# Patient Record
Sex: FEMALE | Race: WHITE | NOT HISPANIC OR LATINO | Employment: OTHER | URBAN - METROPOLITAN AREA
[De-identification: names, ages, dates, MRNs, and addresses within clinical notes are randomized per-mention and may not be internally consistent; named-entity substitution may affect disease eponyms.]

---

## 2022-03-21 ENCOUNTER — TELEPHONE (OUTPATIENT)
Dept: GASTROENTEROLOGY | Facility: CLINIC | Age: 77
End: 2022-03-21

## 2022-03-21 ENCOUNTER — PREP FOR SURGERY (OUTPATIENT)
Dept: OTHER | Facility: HOSPITAL | Age: 77
End: 2022-03-21

## 2022-03-21 DIAGNOSIS — Z86.010 PERSONAL HISTORY OF COLONIC POLYPS: Primary | ICD-10-CM

## 2022-03-28 PROBLEM — Z86.010 PERSONAL HISTORY OF COLONIC POLYPS: Status: ACTIVE | Noted: 2022-03-28

## 2022-03-28 PROBLEM — Z86.0100 PERSONAL HISTORY OF COLONIC POLYPS: Status: ACTIVE | Noted: 2022-03-28

## 2022-07-27 ENCOUNTER — TRANSCRIBE ORDERS (OUTPATIENT)
Dept: ADMINISTRATIVE | Facility: HOSPITAL | Age: 77
End: 2022-07-27

## 2022-07-27 ENCOUNTER — LAB (OUTPATIENT)
Dept: LAB | Facility: HOSPITAL | Age: 77
End: 2022-07-27

## 2022-07-27 DIAGNOSIS — I10 ESSENTIAL HYPERTENSION, MALIGNANT: ICD-10-CM

## 2022-07-27 DIAGNOSIS — I10 ESSENTIAL HYPERTENSION, MALIGNANT: Primary | ICD-10-CM

## 2022-07-27 DIAGNOSIS — Z01.818 PRE-OP EXAM: Primary | ICD-10-CM

## 2022-07-27 LAB
ANION GAP SERPL CALCULATED.3IONS-SCNC: 12 MMOL/L (ref 5–15)
BUN SERPL-MCNC: 17 MG/DL (ref 8–23)
BUN/CREAT SERPL: 19.5 (ref 7–25)
CALCIUM SPEC-SCNC: 8.9 MG/DL (ref 8.6–10.5)
CHLORIDE SERPL-SCNC: 107 MMOL/L (ref 98–107)
CO2 SERPL-SCNC: 23 MMOL/L (ref 22–29)
CREAT SERPL-MCNC: 0.87 MG/DL (ref 0.76–1.27)
EGFRCR SERPLBLD CKD-EPI 2021: 89.4 ML/MIN/1.73
GLUCOSE SERPL-MCNC: 94 MG/DL (ref 65–99)
POTASSIUM SERPL-SCNC: 4.4 MMOL/L (ref 3.5–5.2)
SODIUM SERPL-SCNC: 142 MMOL/L (ref 136–145)

## 2022-07-27 PROCEDURE — 36415 COLL VENOUS BLD VENIPUNCTURE: CPT

## 2022-07-27 PROCEDURE — 80048 BASIC METABOLIC PNL TOTAL CA: CPT

## 2022-08-02 ENCOUNTER — ANESTHESIA EVENT (OUTPATIENT)
Dept: PERIOP | Facility: HOSPITAL | Age: 77
End: 2022-08-02

## 2022-08-03 ENCOUNTER — HOSPITAL ENCOUNTER (OUTPATIENT)
Facility: HOSPITAL | Age: 77
Setting detail: HOSPITAL OUTPATIENT SURGERY
Discharge: HOME | End: 2022-08-03
Attending: INTERNAL MEDICINE | Admitting: INTERNAL MEDICINE

## 2022-08-03 ENCOUNTER — ANESTHESIA (OUTPATIENT)
Dept: PERIOP | Facility: HOSPITAL | Age: 77
End: 2022-08-03

## 2022-08-03 VITALS
RESPIRATION RATE: 15 BRPM | HEART RATE: 73 BPM | WEIGHT: 192 LBS | BODY MASS INDEX: 37.69 KG/M2 | TEMPERATURE: 97.8 F | DIASTOLIC BLOOD PRESSURE: 73 MMHG | OXYGEN SATURATION: 94 % | SYSTOLIC BLOOD PRESSURE: 116 MMHG | HEIGHT: 60 IN

## 2022-08-03 DIAGNOSIS — Z86.010 PERSONAL HISTORY OF COLONIC POLYPS: ICD-10-CM

## 2022-08-03 PROCEDURE — 25010000002 PROPOFOL 10 MG/ML EMULSION: Performed by: NURSE ANESTHETIST, CERTIFIED REGISTERED

## 2022-08-03 PROCEDURE — 45380 COLONOSCOPY AND BIOPSY: CPT | Performed by: INTERNAL MEDICINE

## 2022-08-03 PROCEDURE — 88305 TISSUE EXAM BY PATHOLOGIST: CPT | Performed by: INTERNAL MEDICINE

## 2022-08-03 PROCEDURE — 45385 COLONOSCOPY W/LESION REMOVAL: CPT | Performed by: INTERNAL MEDICINE

## 2022-08-03 RX ORDER — ISOSORBIDE MONONITRATE 60 MG/1
60 TABLET, EXTENDED RELEASE ORAL DAILY
COMMUNITY

## 2022-08-03 RX ORDER — LISINOPRIL 10 MG/1
10 TABLET ORAL DAILY
COMMUNITY

## 2022-08-03 RX ORDER — AMLODIPINE BESYLATE 10 MG/1
10 TABLET ORAL DAILY
COMMUNITY

## 2022-08-03 RX ORDER — SODIUM CHLORIDE 0.9 % (FLUSH) 0.9 %
10 SYRINGE (ML) INJECTION EVERY 12 HOURS SCHEDULED
Status: DISCONTINUED | OUTPATIENT
Start: 2022-08-03 | End: 2022-08-03 | Stop reason: HOSPADM

## 2022-08-03 RX ORDER — OMEPRAZOLE 20 MG/1
20 CAPSULE, DELAYED RELEASE ORAL DAILY
COMMUNITY

## 2022-08-03 RX ORDER — MECLIZINE HYDROCHLORIDE 25 MG/1
25 TABLET ORAL 3 TIMES DAILY PRN
COMMUNITY

## 2022-08-03 RX ORDER — PROPOFOL 10 MG/ML
VIAL (ML) INTRAVENOUS AS NEEDED
Status: DISCONTINUED | OUTPATIENT
Start: 2022-08-03 | End: 2022-08-03 | Stop reason: SURG

## 2022-08-03 RX ORDER — PREGABALIN 50 MG/1
50 CAPSULE ORAL 3 TIMES DAILY
COMMUNITY

## 2022-08-03 RX ORDER — KETAMINE HYDROCHLORIDE 10 MG/ML
INJECTION INTRAMUSCULAR; INTRAVENOUS AS NEEDED
Status: DISCONTINUED | OUTPATIENT
Start: 2022-08-03 | End: 2022-08-03 | Stop reason: SURG

## 2022-08-03 RX ORDER — SODIUM CHLORIDE, SODIUM LACTATE, POTASSIUM CHLORIDE, CALCIUM CHLORIDE 600; 310; 30; 20 MG/100ML; MG/100ML; MG/100ML; MG/100ML
100 INJECTION, SOLUTION INTRAVENOUS CONTINUOUS
Status: DISCONTINUED | OUTPATIENT
Start: 2022-08-03 | End: 2022-08-03 | Stop reason: HOSPADM

## 2022-08-03 RX ORDER — BUPROPION HYDROCHLORIDE 100 MG/1
100 TABLET ORAL 2 TIMES DAILY
COMMUNITY

## 2022-08-03 RX ORDER — LIDOCAINE HYDROCHLORIDE 10 MG/ML
0.5 INJECTION, SOLUTION EPIDURAL; INFILTRATION; INTRACAUDAL; PERINEURAL ONCE AS NEEDED
Status: DISCONTINUED | OUTPATIENT
Start: 2022-08-03 | End: 2022-08-03 | Stop reason: HOSPADM

## 2022-08-03 RX ORDER — ASPIRIN 81 MG/1
81 TABLET, CHEWABLE ORAL DAILY
COMMUNITY

## 2022-08-03 RX ORDER — SODIUM CHLORIDE, SODIUM LACTATE, POTASSIUM CHLORIDE, CALCIUM CHLORIDE 600; 310; 30; 20 MG/100ML; MG/100ML; MG/100ML; MG/100ML
9 INJECTION, SOLUTION INTRAVENOUS CONTINUOUS
Status: DISCONTINUED | OUTPATIENT
Start: 2022-08-03 | End: 2022-08-03 | Stop reason: HOSPADM

## 2022-08-03 RX ORDER — SODIUM CHLORIDE 0.9 % (FLUSH) 0.9 %
10 SYRINGE (ML) INJECTION AS NEEDED
Status: DISCONTINUED | OUTPATIENT
Start: 2022-08-03 | End: 2022-08-03 | Stop reason: HOSPADM

## 2022-08-03 RX ORDER — ONDANSETRON 2 MG/ML
4 INJECTION INTRAMUSCULAR; INTRAVENOUS ONCE AS NEEDED
Status: DISCONTINUED | OUTPATIENT
Start: 2022-08-03 | End: 2022-08-03 | Stop reason: HOSPADM

## 2022-08-03 RX ORDER — SODIUM CHLORIDE 9 MG/ML
40 INJECTION, SOLUTION INTRAVENOUS AS NEEDED
Status: DISCONTINUED | OUTPATIENT
Start: 2022-08-03 | End: 2022-08-03 | Stop reason: HOSPADM

## 2022-08-03 RX ORDER — LEVOTHYROXINE SODIUM 112 UG/1
112 TABLET ORAL DAILY
COMMUNITY

## 2022-08-03 RX ORDER — LIDOCAINE HYDROCHLORIDE 20 MG/ML
INJECTION, SOLUTION INFILTRATION; PERINEURAL AS NEEDED
Status: DISCONTINUED | OUTPATIENT
Start: 2022-08-03 | End: 2022-08-03 | Stop reason: SURG

## 2022-08-03 RX ORDER — ATORVASTATIN CALCIUM 20 MG/1
20 TABLET, FILM COATED ORAL DAILY
COMMUNITY

## 2022-08-03 RX ORDER — DEXMEDETOMIDINE HYDROCHLORIDE 100 UG/ML
INJECTION, SOLUTION INTRAVENOUS AS NEEDED
Status: DISCONTINUED | OUTPATIENT
Start: 2022-08-03 | End: 2022-08-03 | Stop reason: SURG

## 2022-08-03 RX ORDER — NITROGLYCERIN 0.4 MG/1
0.4 TABLET SUBLINGUAL
COMMUNITY

## 2022-08-03 RX ORDER — TRAMADOL HYDROCHLORIDE 50 MG/1
50 TABLET ORAL EVERY 6 HOURS PRN
COMMUNITY

## 2022-08-03 RX ADMIN — KETAMINE HYDROCHLORIDE 20 MG: 10 INJECTION, SOLUTION INTRAMUSCULAR; INTRAVENOUS at 15:27

## 2022-08-03 RX ADMIN — PROPOFOL 30 MG: 10 INJECTION, EMULSION INTRAVENOUS at 15:36

## 2022-08-03 RX ADMIN — PROPOFOL 30 MG: 10 INJECTION, EMULSION INTRAVENOUS at 15:57

## 2022-08-03 RX ADMIN — DEXMEDETOMIDINE 20 MCG: 100 INJECTION, SOLUTION, CONCENTRATE INTRAVENOUS at 15:27

## 2022-08-03 RX ADMIN — SODIUM CHLORIDE, POTASSIUM CHLORIDE, SODIUM LACTATE AND CALCIUM CHLORIDE 9 ML/HR: 600; 310; 30; 20 INJECTION, SOLUTION INTRAVENOUS at 15:03

## 2022-08-03 RX ADMIN — PROPOFOL 30 MG: 10 INJECTION, EMULSION INTRAVENOUS at 15:41

## 2022-08-03 RX ADMIN — PROPOFOL 30 MG: 10 INJECTION, EMULSION INTRAVENOUS at 15:30

## 2022-08-03 RX ADMIN — PROPOFOL 30 MG: 10 INJECTION, EMULSION INTRAVENOUS at 15:45

## 2022-08-03 RX ADMIN — PROPOFOL 30 MG: 10 INJECTION, EMULSION INTRAVENOUS at 15:50

## 2022-08-03 RX ADMIN — LIDOCAINE HYDROCHLORIDE 100 MG: 20 INJECTION, SOLUTION INFILTRATION; PERINEURAL at 15:27

## 2022-08-03 RX ADMIN — PROPOFOL 30 MG: 10 INJECTION, EMULSION INTRAVENOUS at 16:03

## 2022-08-03 NOTE — H&P
Patient Care Team:  Madeleine Braynt MD as PCP - General (Geriatric Medicine)    CHIEF COMPLAINT: Personal hx colon polyps    HISTORY OF PRESENT ILLNESS:  Last exam was 2016    No past medical history on file.  No past surgical history on file.  No family history on file.     No medications prior to admission.     Allergies:  Patient has no allergy information on record.    REVIEW OF SYSTEMS:  Please see the above history of present illness for pertinent positives and negatives.  The remainder of the patient's systems have been reviewed and are negative.     Vital Signs            Physical Exam:  Physical Exam   Constitutional: Patient appears well-developed and well-nourished and in no acute distress   HEENT:   Head: Normocephalic and atraumatic.   Eyes:  Pupils are equal, round, and reactive to light. EOM are intact. Sclerae are anicteric and non-injected.  Mouth and Throat: Patient has moist mucous membranes. Oropharynx is clear of any erythema or exudate.     Neck: Neck supple. No JVD present. No thyromegaly present. No lymphadenopathy present.  Cardiovascular: Regular rate, regular rhythm, S1 normal and S2 normal.  Exam reveals no gallop and no friction rub.  No murmur heard.  Pulmonary/Chest: Lungs are clear to auscultation bilaterally. No respiratory distress. No wheezes. No rhonchi. No rales.   Abdominal: Soft. Bowel sounds are normal. No distension and no mass. There is no hepatosplenomegaly. There is no tenderness.   Musculoskeletal: Normal Muscle tone  Extremities: No edema. Pulses are palpable in all 4 extremities.  Neurological: Patient is alert and oriented to person, place, and time. Cranial nerves II-XII are grossly intact with no focal deficits.  Skin: Skin is warm. No rash noted. Nails show no clubbing.  No cyanosis or erythema.    Debilities/Disabilities Identified: None  Emotional Behavior: Appropriate     Results Review:   I reviewed the patient's new clinical results.    Lab Results (most  recent)     None          Imaging Results (Most Recent)     None        reviewed    ECG/EMG Results (most recent)     None        reviewed    Assessment & Plan   Personal hx colon polyps/  colonoscopy      I discussed the patient's findings and my recommendations with patient.     Anoop Cee MD  08/03/22  14:39 EDT    Time: 10 min prior to procedure.

## 2022-08-03 NOTE — ANESTHESIA POSTPROCEDURE EVALUATION
Patient: Veronica Soliz    Procedure Summary     Date: 08/03/22 Room / Location: [facility] ENDOSCOPY 1 /  [facility]    Anesthesia Start: 1525 Anesthesia Stop: 1607    Procedure: COLONOSCOPY; POLYPECTOMY (N/A ) Diagnosis:       Personal history of colonic polyps      Diverticulosis      Colon polyp      (Personal history of colonic polyps [Z86.010])    Surgeons: Anoop Cee MD Provider: Bozena Smith CRNA    Anesthesia Type: MAC ASA Status: 3          Anesthesia Type: MAC    Vitals  Vitals Value Taken Time   /85 08/03/22 1635   Temp     Pulse 70 08/03/22 1635   Resp 12 08/03/22 1635   SpO2 94 % 08/03/22 1635           Post Anesthesia Care and Evaluation    Patient location during evaluation: PHASE II  Patient participation: complete - patient participated  Level of consciousness: awake  Pain management: adequate    Airway patency: patent  Anesthetic complications: No anesthetic complications  PONV Status: none  Cardiovascular status: acceptable  Respiratory status: acceptable  Hydration status: acceptable

## 2022-08-03 NOTE — BRIEF OP NOTE
COLONOSCOPY  Progress Note    Veronica Soliz  8/3/2022    Pre-op Diagnosis:   Personal history of colonic polyps [Z86.010]       Post-Op Diagnosis Codes:     * Personal history of colonic polyps [Z86.010]     * Diverticulosis [K57.90]     * Colon polyp [K63.5]    Procedure/CPT® Codes:        Procedure(s):  COLONOSCOPY; POLYPECTOMY    Surgeon(s):  Anoop Cee MD    Anesthesia: Monitored Anesthesia Care    Staff:   Circulator: Rita Tena RN  Scrub Person: Shelli Huang Sarah         Estimated Blood Loss: none    Urine Voided: * No values recorded between 8/3/2022  3:25 PM and 8/3/2022  4:00 PM *    Specimens:                Specimens     ID Source Type Tests Collected By Collected At Frozen?    A Large Intestine, Transverse Colon Polyp · TISSUE PATHOLOGY EXAM   Anoop Cee MD 8/3/22 1541     Description: x5    Comment: FORCEP X4  COLD SNARE X1    B Large Intestine, Right / Ascending Colon Polyp · TISSUE PATHOLOGY EXAM   Anoop Cee MD 8/3/22 1547     Description: X2    C Large Intestine, Sigmoid Colon Polyp · TISSUE PATHOLOGY EXAM   Anoop Cee MD 8/3/22 1559     D Large Intestine, Rectum Polyp · TISSUE PATHOLOGY EXAM   Anoop Cee MD 8/3/22 1559                 Drains: * No LDAs found *    Findings: Colon to Cecum Good Prep  Diverticulosis  Polyps-9-Cold Snare x 1        Complications: None          Anoop Cee MD     Date: 8/3/2022  Time: 16:03 EDT

## 2022-08-03 NOTE — ANESTHESIA PREPROCEDURE EVALUATION
Anesthesia Evaluation     Patient summary reviewed and Nursing notes reviewed   no history of anesthetic complications:  NPO Solid Status: > 8 hours  NPO Liquid Status: > 4 hours           Airway   Mallampati: II  TM distance: >3 FB  Neck ROM: full  No difficulty expected  Dental    (+) edentulous    Pulmonary     breath sounds clear to auscultation  (+) a smoker Current Smoked day of surgery, asthma (used inhalers this am),sleep apnea (scheduled for a sleep test),   Cardiovascular   Exercise tolerance: good (4-7 METS)    PT is on anticoagulation therapy  Rhythm: regular  Rate: normal    (+) hypertension well controlled 2 medications or greater, past MI (1998, 2000)  >12 months, CAD, PVD, hyperlipidemia,       Neuro/Psych  (+) neuromuscular disease (RLS ?parkinsons ), dizziness/light headedness (vertigo), weakness (sylvester legs ), numbness (sylvester feet ),    GI/Hepatic/Renal/Endo    (+)  GERD well controlled,  hepatitis (1993) B, renal disease CRI, thyroid problem hypothyroidism    Musculoskeletal     (+) arthralgias, back pain, chronic pain, gait problem (walks with an aide ), myalgias, neck pain (C3 herniated disc ), neck stiffness,   Abdominal   (+) obese,    Substance History      OB/GYN          Other   arthritis,    history of cancer ( 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    Use of blood products discussed with patient  Consented to blood products.       CODE STATUS:

## 2022-08-05 LAB
LAB AP CASE REPORT: NORMAL
LAB AP CLINICAL INFORMATION: NORMAL
PATH REPORT.FINAL DX SPEC: NORMAL
PATH REPORT.GROSS SPEC: NORMAL

## 2023-07-26 ENCOUNTER — LAB (OUTPATIENT)
Dept: LAB | Facility: HOSPITAL | Age: 78
End: 2023-07-26
Payer: OTHER GOVERNMENT

## 2023-07-26 ENCOUNTER — OFFICE VISIT (OUTPATIENT)
Dept: CARDIOLOGY | Facility: CLINIC | Age: 78
End: 2023-07-26
Payer: OTHER GOVERNMENT

## 2023-07-26 VITALS
WEIGHT: 180 LBS | DIASTOLIC BLOOD PRESSURE: 78 MMHG | HEIGHT: 60 IN | SYSTOLIC BLOOD PRESSURE: 110 MMHG | OXYGEN SATURATION: 94 % | RESPIRATION RATE: 16 BRPM | BODY MASS INDEX: 35.34 KG/M2

## 2023-07-26 DIAGNOSIS — E78.2 MIXED HYPERLIPIDEMIA: ICD-10-CM

## 2023-07-26 DIAGNOSIS — R06.09 DYSPNEA ON EXERTION: ICD-10-CM

## 2023-07-26 DIAGNOSIS — I10 HYPERTENSION, UNSPECIFIED TYPE: ICD-10-CM

## 2023-07-26 DIAGNOSIS — I25.10 CORONARY ARTERY DISEASE INVOLVING NATIVE CORONARY ARTERY OF NATIVE HEART WITHOUT ANGINA PECTORIS: Primary | ICD-10-CM

## 2023-07-26 DIAGNOSIS — I25.10 CORONARY ARTERY DISEASE INVOLVING NATIVE CORONARY ARTERY OF NATIVE HEART WITHOUT ANGINA PECTORIS: ICD-10-CM

## 2023-07-26 LAB
ALBUMIN SERPL-MCNC: 4.5 G/DL (ref 3.5–5.2)
ALBUMIN/GLOB SERPL: 1.7 G/DL
ALP SERPL-CCNC: 34 U/L (ref 39–117)
ALT SERPL W P-5'-P-CCNC: 14 U/L (ref 1–33)
ANION GAP SERPL CALCULATED.3IONS-SCNC: 8 MMOL/L (ref 5–15)
AST SERPL-CCNC: 14 U/L (ref 1–32)
BASOPHILS # BLD AUTO: 0.04 10*3/MM3 (ref 0–0.2)
BASOPHILS NFR BLD AUTO: 0.5 % (ref 0–1.5)
BILIRUB SERPL-MCNC: 0.3 MG/DL (ref 0–1.2)
BUN SERPL-MCNC: 17 MG/DL (ref 8–23)
BUN/CREAT SERPL: 20.5 (ref 7–25)
CALCIUM SPEC-SCNC: 9.6 MG/DL (ref 8.6–10.5)
CHLORIDE SERPL-SCNC: 103 MMOL/L (ref 98–107)
CHOLEST SERPL-MCNC: 170 MG/DL (ref 0–200)
CO2 SERPL-SCNC: 29 MMOL/L (ref 22–29)
CREAT SERPL-MCNC: 0.83 MG/DL (ref 0.57–1)
DEPRECATED RDW RBC AUTO: 42 FL (ref 37–54)
EGFRCR SERPLBLD CKD-EPI 2021: 72.7 ML/MIN/1.73
EOSINOPHIL # BLD AUTO: 0.35 10*3/MM3 (ref 0–0.4)
EOSINOPHIL NFR BLD AUTO: 4.5 % (ref 0.3–6.2)
ERYTHROCYTE [DISTWIDTH] IN BLOOD BY AUTOMATED COUNT: 13 % (ref 12.3–15.4)
GLOBULIN UR ELPH-MCNC: 2.6 GM/DL
GLUCOSE SERPL-MCNC: 99 MG/DL (ref 65–99)
HBA1C MFR BLD: 6 % (ref 4.8–5.6)
HCT VFR BLD AUTO: 47.5 % (ref 34–46.6)
HDLC SERPL-MCNC: 55 MG/DL (ref 40–60)
HGB BLD-MCNC: 15.6 G/DL (ref 12–15.9)
IMM GRANULOCYTES # BLD AUTO: 0.02 10*3/MM3 (ref 0–0.05)
IMM GRANULOCYTES NFR BLD AUTO: 0.3 % (ref 0–0.5)
LDLC SERPL CALC-MCNC: 101 MG/DL (ref 0–100)
LDLC/HDLC SERPL: 1.81 {RATIO}
LYMPHOCYTES # BLD AUTO: 2.13 10*3/MM3 (ref 0.7–3.1)
LYMPHOCYTES NFR BLD AUTO: 27.3 % (ref 19.6–45.3)
MCH RBC QN AUTO: 29.2 PG (ref 26.6–33)
MCHC RBC AUTO-ENTMCNC: 32.8 G/DL (ref 31.5–35.7)
MCV RBC AUTO: 88.8 FL (ref 79–97)
MONOCYTES # BLD AUTO: 0.73 10*3/MM3 (ref 0.1–0.9)
MONOCYTES NFR BLD AUTO: 9.4 % (ref 5–12)
NEUTROPHILS NFR BLD AUTO: 4.53 10*3/MM3 (ref 1.7–7)
NEUTROPHILS NFR BLD AUTO: 58 % (ref 42.7–76)
NRBC BLD AUTO-RTO: 0 /100 WBC (ref 0–0.2)
PLATELET # BLD AUTO: 253 10*3/MM3 (ref 140–450)
PMV BLD AUTO: 10.3 FL (ref 6–12)
POTASSIUM SERPL-SCNC: 4.4 MMOL/L (ref 3.5–5.2)
PROT SERPL-MCNC: 7.1 G/DL (ref 6–8.5)
RBC # BLD AUTO: 5.35 10*6/MM3 (ref 3.77–5.28)
SODIUM SERPL-SCNC: 140 MMOL/L (ref 136–145)
TRIGL SERPL-MCNC: 76 MG/DL (ref 0–150)
VLDLC SERPL-MCNC: 14 MG/DL (ref 5–40)
WBC NRBC COR # BLD: 7.8 10*3/MM3 (ref 3.4–10.8)

## 2023-07-26 PROCEDURE — 80061 LIPID PANEL: CPT

## 2023-07-26 PROCEDURE — 80053 COMPREHEN METABOLIC PANEL: CPT

## 2023-07-26 PROCEDURE — 83036 HEMOGLOBIN GLYCOSYLATED A1C: CPT

## 2023-07-26 PROCEDURE — 85025 COMPLETE CBC W/AUTO DIFF WBC: CPT

## 2023-07-26 PROCEDURE — 93000 ELECTROCARDIOGRAM COMPLETE: CPT | Performed by: STUDENT IN AN ORGANIZED HEALTH CARE EDUCATION/TRAINING PROGRAM

## 2023-07-26 PROCEDURE — 99204 OFFICE O/P NEW MOD 45 MIN: CPT | Performed by: STUDENT IN AN ORGANIZED HEALTH CARE EDUCATION/TRAINING PROGRAM

## 2023-07-26 PROCEDURE — 36415 COLL VENOUS BLD VENIPUNCTURE: CPT

## 2023-07-26 RX ORDER — ATORVASTATIN CALCIUM 40 MG/1
40 TABLET, FILM COATED ORAL NIGHTLY
Qty: 90 TABLET | Refills: 3 | Status: SHIPPED | OUTPATIENT
Start: 2023-07-26 | End: 2024-07-25

## 2023-07-26 RX ORDER — CETIRIZINE HYDROCHLORIDE 10 MG/1
10 TABLET ORAL DAILY
COMMUNITY

## 2023-07-26 RX ORDER — FLUTICASONE PROPIONATE 50 MCG
2 SPRAY, SUSPENSION (ML) NASAL DAILY
COMMUNITY

## 2023-07-26 RX ORDER — OXYBUTYNIN CHLORIDE 5 MG/1
5 TABLET ORAL 3 TIMES DAILY
COMMUNITY

## 2023-07-26 RX ORDER — DOXYCYCLINE HYCLATE 50 MG/1
50 CAPSULE ORAL 2 TIMES DAILY
COMMUNITY

## 2023-07-26 NOTE — PROGRESS NOTES
"      [facility]    Subjective:     Encounter Date:07/26/23      Patient ID: Veronica Soliz is a 77 y.o. female.    Chief Complaint: No chief complaint on file.  Establish care for coronary heart disease  History of Present Illness    Ms. Soliz is a pleasant 77-year-old  past medical history multiple \"stress-induced\" heart attacks, with coronary angiography is without culprit vessels in 1998, 2005, chest pain, tobacco abuse, hyperlipidemia, hypertension, significant osteoarthritis, who presents to establish care.    She reports that in late May/early July, she was told that she had \"stress heart attack.\"  She reports that she was under a lot of stress due to recent medication changes from her chronic pain regimen due to osteoarthritis of bilateral knees, and states that she began to have a pressure-like twinging sensation that occurred at the center of her chest.  [facility] and [facility].  The stress test revealed a small amount of apical ischemia, there is no report of any high risk features like transit ischemic dilation or reduced EF.  Her EF was normal on the stress test, as well as the echo.  Her echo did reveal diminished GLS    She been maintained on Imdur, amlodipine, Lipitor for many years.  She has chronic dyspnea on exertion and states that she has been told that she has lung problems and continues to smoke.  Otherwise, since her initial event in early July, she has had no further episodes of the chest pain which she described as a twinging like chest burning sensation.  She does report chronic pain issues ongoing with pins-and-needles sensation and intermittent \"burning all over\" but no further chest pain since the initial event.    She presents to establish care for the abnormal stress test and her prior history of coronary disease that was nonobstructive.  [facility] most [facility]    She " "underwent a nuclear stress test on July 6, 2023:  She underwent Lexiscan protocol.  It was read as cannot rule out small reversible apical defect, LVEF 65%.  Tricuspid, normal aortic, mild calcification of mitral valve but otherwise, normal.  RA pressure 0-5.  She was noted to have mild obstructive disease on PFTs.    The following portions of the patient's history were reviewed and updated as appropriate: allergies, current medications, past family history, past medical history, past social history, past surgical history and problem list.    Past Medical History:   Diagnosis Date    Asthma     Cancer 10/06/2010    Coronary artery disease     Hypertension        Past Surgical History:   Procedure Laterality Date    CARDIAC CATHETERIZATION      COLONOSCOPY W/ POLYPECTOMY N/A 8/3/2022    Procedure: COLONOSCOPY; POLYPECTOMY;  Surgeon: Anoop Cee MD;  Location: [facility];  Service: Gastroenterology;  Laterality: N/A;  POLYPS  DIVERTICULOSIS           ECG 12 Lead    Date/Time: 7/26/2023 12:11 PM  Performed by: Micah Campbell MD  Authorized by: Micah Campbell MD   Comparison: not compared with previous ECG   Previous ECG: no previous ECG available  Rhythm: sinus rhythm  Rate: normal  Conduction: conduction normal  ST Segments: ST segments normal  T Waves: T waves normal  QRS axis: normal  Other: no other findings    Clinical impression: normal ECG           Objective:     Vitals:    07/26/23 1118   BP: 110/78   Resp: 16   SpO2: 94%   Weight: 81.6 kg (180 lb)   Height: 152.4 cm (60\")         Constitutional:       Appearance: Not in distress. Frail. Chronically ill-appearing.      Comments: She appears older than stated age   Neck:      Vascular: JVD normal.   Pulmonary:      Effort: Pulmonary effort is normal.      Breath sounds: Normal air entry.      Comments: Scant expiratory wheeze, normal work of breathing  Cardiovascular:      PMI at left midclavicular line. Normal rate. Regular rhythm. Normal S2.  "      Murmurs: There is no murmur.   Pulses:     Intact distal pulses.   Edema:     Peripheral edema absent.   Skin:     General: Skin is warm and dry.   Neurological:      General: No focal deficit present.      Mental Status: Alert, oriented to person, place, and time and oriented to person, place and time.   Psychiatric:         Mood and Affect: Mood and affect normal.       Lab Review:       BUN   Date Value Ref Range Status   07/27/2022 17 8 - 23 mg/dL Final     Creatinine   Date Value Ref Range Status   07/27/2022 0.87 0.76 - 1.27 mg/dL Final     Potassium   Date Value Ref Range Status   07/27/2022 4.4 3.5 - 5.2 mmol/L Final     Comment:     Specimen hemolyzed.  Results may be affected.         Performed        Assessment:          Diagnosis Plan   1. Coronary artery disease involving native coronary artery of native heart without angina pectoris  CBC & Differential    Comprehensive Metabolic Panel    Lipid Panel    Hemoglobin A1c      2. Dyspnea on exertion  CBC & Differential    Comprehensive Metabolic Panel    Lipid Panel    Hemoglobin A1c      3. Hypertension, unspecified type        4. Mixed hyperlipidemia  CBC & Differential    Comprehensive Metabolic Panel    Lipid Panel    Hemoglobin A1c             Plan:         Coronary artery disease, previously nonobstructive:   Abnormal stress test with small amount of apical ischemia without high risk features done at [facility] July 6, 2023  Currently, she is denying symptoms of angina.  She had an atypical chest pain episode that occurred in the setting of what sounds to be changes to her chronic pain regimen.  She does have chronic dyspnea on exertion but also continues to smoke and has lung disease.  Would continue current antianginal therapy, I did advise patient that if she has any findings of typical angina with a substernal chest pressure that worsens with exertion, or any new concerning findings like that to reach out to our office, but for now we  will treat medically  Continue Imdur 60  Amlodipine 10  Not a candidate for beta-blockade due to resting bradycardia  Aspirin 81  I would be hesitant to proceed with a coronary angiogram with possible PCI given her overall frailty and she has a lot of reasons to feel short of breath, we will try to focus on medical therapy per above  Reviewed her recent echocardiogram for the [facility], normal LVEF, a abnormal lead diminished GLS.  Medical therapy per above.  Hyperlipidemia: Check lipids today, will need to have LDL less than 60  Hypertension: Very well controlled, continue current regimen    Thank you for allowing me to participate in the care of Veronica Soliz. Feel free to contact me directly with any further questions or concerns.    RTC 3 months for symptom check-in.  I did advise patient to call our office if she has any worsening findings of chest pain which case we can consider adding Ranexa or arrange for coronary angiography depending on the severity of her symptoms.  Again per [facility], there are no high risk features on her testing, or clinical history that would necessitate need for invasive approach at this time.  We will monitor clinically.    Micah Campbell MD  [facility]  07/26/23  11:42 EDT       Current Outpatient Medications:     ALBUTEROL IN, Inhale., Disp: , Rfl:     amLODIPine (NORVASC) 10 MG tablet, Take 1 tablet by mouth Daily., Disp: , Rfl:     aspirin 81 MG chewable tablet, Chew 1 tablet Daily., Disp: , Rfl:     atorvastatin (LIPITOR) 20 MG tablet, Take 1 tablet by mouth Daily., Disp: , Rfl:     cetirizine (zyrTEC) 10 MG tablet, Take 1 tablet by mouth Daily., Disp: , Rfl:     doxycycline (VIBRAMYCIN) 50 MG capsule, Take 1 capsule by mouth 2 (Two) Times a Day., Disp: , Rfl:     fluticasone (FLONASE) 50 MCG/ACT nasal spray, 2 sprays into the nostril(s) as directed by provider Daily., Disp: , Rfl:     isosorbide mononitrate (IMDUR) 60 MG 24 hr tablet,  Take 1 tablet by mouth Daily., Disp: , Rfl:     levothyroxine (SYNTHROID, LEVOTHROID) 112 MCG tablet, Take 1 tablet by mouth Daily., Disp: , Rfl:     lisinopril (PRINIVIL,ZESTRIL) 10 MG tablet, Take 1 tablet by mouth Daily., Disp: , Rfl:     meclizine (ANTIVERT) 25 MG tablet, Take 1 tablet by mouth 3 (Three) Times a Day As Needed for Dizziness., Disp: , Rfl:     nitroglycerin (NITROSTAT) 0.4 MG SL tablet, Place 1 tablet under the tongue Every 5 (Five) Minutes As Needed for Chest Pain. Take no more than 3 doses in 15 minutes., Disp: , Rfl:     omeprazole (priLOSEC) 20 MG capsule, Take 1 capsule by mouth Daily., Disp: , Rfl:     oxybutynin (DITROPAN) 5 MG tablet, Take 1 tablet by mouth 3 (Three) Times a Day., Disp: , Rfl:     traMADol (ULTRAM) 50 MG tablet, Take 1 tablet by mouth Every 6 (Six) Hours As Needed for Moderate Pain., Disp: , Rfl:          Return in about 3 months (around 10/26/2023).      Part of this note may be an electronic transcription/translation of spoken language to printed text using the Dragon Dictation System.

## 2023-07-26 NOTE — PROGRESS NOTES
Can you please call patient and let her know that her electrolytes were normal, her kidney function was normal,And her blood counts were normal.  There are no signs of diabetes.  The bad cholesterol, LDL, is slightly higher than we wanted.  We want to see it less than 70, it is currently 101.  To achieve this goal, I would recommend she increase her dose of atorvastatin to 40 mg, I have sent a new dose of the higher dose to her pharmacy.  She can take 2 of the 20 mg tablets until she gets her new prescription.  Thank you for the help

## 2023-07-26 NOTE — PATIENT INSTRUCTIONS
The stress test suggested that the very tip of your heart was not getting the blood flow it needed at peak stress.  It appears that this is overall a low risk finding.  We need to make sure that we intensify her medical regimen to prevent any future heart attacks.  The #1 thing that you can do for your overall health is to stop smoking.    Please stop by the lab today to get blood work to assess your cardiovascular risk, we may need to add a medication to your regimen.  Otherwise if you have any new complaints of chest pain or worsens symptoms with exertion, please let us know.  We can arrange for a heart cath with possible stent at that time, but for now I would use medical therapy to try to see if we can prevent any chest pains from happening.

## 2023-07-27 ENCOUNTER — TELEPHONE (OUTPATIENT)
Dept: CARDIOLOGY | Facility: CLINIC | Age: 78
End: 2023-07-27
Payer: OTHER GOVERNMENT

## 2023-07-27 NOTE — TELEPHONE ENCOUNTER
----- Message from Micah Campbell MD sent at 7/26/2023  6:17 PM EDT -----  Can you please call patient and let her know that her electrolytes were normal, her kidney function was normal,And her blood counts were normal.  There are no signs of diabetes.  The bad cholesterol, LDL, is slightly higher than we wanted.  We want to see it less than 70, it is currently 101.  To achieve this goal, I would recommend she increase her dose of atorvastatin to 40 mg, I have sent a new dose of the higher dose to her pharmacy.  She can take 2 of the 20 mg tablets until she gets her new prescription.  Thank you for the help

## 2023-07-28 NOTE — TELEPHONE ENCOUNTER
Notified pt of results and new orders. Pt verbalized understanding.    Thank you,    Rita Bernstein, RN  Triage [facility] [place]  07/28/23 10:32 EDT

## 2023-10-23 ENCOUNTER — TELEPHONE (OUTPATIENT)
Dept: CARDIOLOGY | Facility: CLINIC | Age: 78
End: 2023-10-23

## 2023-10-23 NOTE — TELEPHONE ENCOUNTER
Caller: Veronica Soliz    Relationship: Self    Best call back number: [phone]    What is the best time to reach you: ANY    Who are you requesting to speak with (clinical staff, provider,  specific staff member): CLINICAL    Do you know the name of the person who called: CARROLL    What was the call regarding: RESCHEDULING    Is it okay if the provider responds through MyChart: NONE

## 2024-03-12 ENCOUNTER — HOSPITAL ENCOUNTER (EMERGENCY)
Facility: HOSPITAL | Age: 79
Discharge: HOME | End: 2024-03-12
Attending: EMERGENCY MEDICINE | Admitting: EMERGENCY MEDICINE
Payer: OTHER GOVERNMENT

## 2024-03-12 ENCOUNTER — APPOINTMENT (OUTPATIENT)
Dept: GENERAL RADIOLOGY | Facility: HOSPITAL | Age: 79
End: 2024-03-12
Payer: OTHER GOVERNMENT

## 2024-03-12 VITALS
SYSTOLIC BLOOD PRESSURE: 148 MMHG | HEART RATE: 76 BPM | BODY MASS INDEX: 33.85 KG/M2 | OXYGEN SATURATION: 94 % | WEIGHT: 172.4 LBS | TEMPERATURE: 97.8 F | RESPIRATION RATE: 22 BRPM | DIASTOLIC BLOOD PRESSURE: 84 MMHG | HEIGHT: 60 IN

## 2024-03-12 DIAGNOSIS — J18.9 ATYPICAL PNEUMONIA: Primary | ICD-10-CM

## 2024-03-12 DIAGNOSIS — R05.1 ACUTE COUGH: ICD-10-CM

## 2024-03-12 LAB
ALBUMIN SERPL-MCNC: 3.3 G/DL (ref 3.5–5.2)
ALBUMIN/GLOB SERPL: 0.8 G/DL
ALP SERPL-CCNC: 41 U/L (ref 39–117)
ALT SERPL W P-5'-P-CCNC: 14 U/L (ref 1–33)
ANION GAP SERPL CALCULATED.3IONS-SCNC: 14 MMOL/L (ref 5–15)
AST SERPL-CCNC: 15 U/L (ref 1–32)
BASOPHILS # BLD AUTO: 0.1 10*3/MM3 (ref 0–0.2)
BASOPHILS NFR BLD AUTO: 0.6 % (ref 0–1.5)
BILIRUB SERPL-MCNC: 0.2 MG/DL (ref 0–1.2)
BUN SERPL-MCNC: 10 MG/DL (ref 8–23)
BUN/CREAT SERPL: 11.2 (ref 7–25)
CALCIUM SPEC-SCNC: 9.2 MG/DL (ref 8.6–10.5)
CHLORIDE SERPL-SCNC: 97 MMOL/L (ref 98–107)
CO2 SERPL-SCNC: 27 MMOL/L (ref 22–29)
CREAT SERPL-MCNC: 0.89 MG/DL (ref 0.57–1)
DEPRECATED RDW RBC AUTO: 43.3 FL (ref 37–54)
EGFRCR SERPLBLD CKD-EPI 2021: 66.5 ML/MIN/1.73
EOSINOPHIL # BLD AUTO: 0.22 10*3/MM3 (ref 0–0.4)
EOSINOPHIL NFR BLD AUTO: 1.3 % (ref 0.3–6.2)
ERYTHROCYTE [DISTWIDTH] IN BLOOD BY AUTOMATED COUNT: 12.7 % (ref 12.3–15.4)
GLOBULIN UR ELPH-MCNC: 3.9 GM/DL
GLUCOSE SERPL-MCNC: 83 MG/DL (ref 65–99)
HCT VFR BLD AUTO: 47 % (ref 34–46.6)
HGB BLD-MCNC: 15.7 G/DL (ref 12–15.9)
IMM GRANULOCYTES # BLD AUTO: 0.15 10*3/MM3 (ref 0–0.05)
IMM GRANULOCYTES NFR BLD AUTO: 0.9 % (ref 0–0.5)
LYMPHOCYTES # BLD AUTO: 2.51 10*3/MM3 (ref 0.7–3.1)
LYMPHOCYTES NFR BLD AUTO: 14.7 % (ref 19.6–45.3)
MCH RBC QN AUTO: 30.7 PG (ref 26.6–33)
MCHC RBC AUTO-ENTMCNC: 33.4 G/DL (ref 31.5–35.7)
MCV RBC AUTO: 91.8 FL (ref 79–97)
MONOCYTES # BLD AUTO: 1.81 10*3/MM3 (ref 0.1–0.9)
MONOCYTES NFR BLD AUTO: 10.6 % (ref 5–12)
NEUTROPHILS NFR BLD AUTO: 12.28 10*3/MM3 (ref 1.7–7)
NEUTROPHILS NFR BLD AUTO: 71.9 % (ref 42.7–76)
NRBC BLD AUTO-RTO: 0 /100 WBC (ref 0–0.2)
PLATELET # BLD AUTO: 288 10*3/MM3 (ref 140–450)
PMV BLD AUTO: 10.1 FL (ref 6–12)
POTASSIUM SERPL-SCNC: 3.5 MMOL/L (ref 3.5–5.2)
PROT SERPL-MCNC: 7.2 G/DL (ref 6–8.5)
RBC # BLD AUTO: 5.12 10*6/MM3 (ref 3.77–5.28)
SODIUM SERPL-SCNC: 138 MMOL/L (ref 136–145)
WBC NRBC COR # BLD AUTO: 17.07 10*3/MM3 (ref 3.4–10.8)

## 2024-03-12 PROCEDURE — 99284 EMERGENCY DEPT VISIT MOD MDM: CPT

## 2024-03-12 PROCEDURE — 25010000002 METHYLPREDNISOLONE PER 125 MG: Performed by: EMERGENCY MEDICINE

## 2024-03-12 PROCEDURE — 80053 COMPREHEN METABOLIC PANEL: CPT | Performed by: EMERGENCY MEDICINE

## 2024-03-12 PROCEDURE — 71045 X-RAY EXAM CHEST 1 VIEW: CPT

## 2024-03-12 PROCEDURE — 96365 THER/PROPH/DIAG IV INF INIT: CPT

## 2024-03-12 PROCEDURE — 85025 COMPLETE CBC W/AUTO DIFF WBC: CPT | Performed by: EMERGENCY MEDICINE

## 2024-03-12 PROCEDURE — 96375 TX/PRO/DX INJ NEW DRUG ADDON: CPT

## 2024-03-12 RX ORDER — GUAIFENESIN 600 MG/1
1200 TABLET, EXTENDED RELEASE ORAL EVERY 12 HOURS PRN
Qty: 20 TABLET | Refills: 0 | Status: SHIPPED | OUTPATIENT
Start: 2024-03-12

## 2024-03-12 RX ORDER — SODIUM CHLORIDE 0.9 % (FLUSH) 0.9 %
10 SYRINGE (ML) INJECTION AS NEEDED
Status: DISCONTINUED | OUTPATIENT
Start: 2024-03-12 | End: 2024-03-12 | Stop reason: HOSPADM

## 2024-03-12 RX ORDER — DOXYCYCLINE 100 MG/1
100 CAPSULE ORAL EVERY 12 HOURS
Qty: 14 CAPSULE | Refills: 0 | Status: SHIPPED | OUTPATIENT
Start: 2024-03-12

## 2024-03-12 RX ORDER — DEXAMETHASONE 2 MG/1
TABLET ORAL
Qty: 12 TABLET | Refills: 0 | Status: SHIPPED | OUTPATIENT
Start: 2024-03-12

## 2024-03-12 RX ORDER — METHYLPREDNISOLONE SODIUM SUCCINATE 125 MG/2ML
125 INJECTION, POWDER, LYOPHILIZED, FOR SOLUTION INTRAMUSCULAR; INTRAVENOUS ONCE
Status: COMPLETED | OUTPATIENT
Start: 2024-03-12 | End: 2024-03-12

## 2024-03-12 RX ADMIN — DOXYCYCLINE 100 MG: 100 INJECTION, POWDER, LYOPHILIZED, FOR SOLUTION INTRAVENOUS at 12:49

## 2024-03-12 RX ADMIN — METHYLPREDNISOLONE SODIUM SUCCINATE 125 MG: 125 INJECTION, POWDER, FOR SOLUTION INTRAMUSCULAR; INTRAVENOUS at 12:46

## 2024-03-12 NOTE — ED PROVIDER NOTES
Subjective   History of Present Illness  Patient presents complaining of a 4 to 5-day history of increasing wheezing and cough with sputum production.  Patient says that the greenish-yellow sputum.  Patient says she was treated about a month ago for the same symptoms and was given steroids and antibiotics and got better.    Patient says now nothing seems to make her symptoms better or worse and she has been trying to use her inhalers but her breathing continues to be get bad very wheezy.   Patient denies any recent travel, sick contacts, bad food exposure, or trauma.  Patient still smokes daily.  No therapy prior to arrival.  Patient denies any chest pain, fever, back pain, near syncope and no blood in her sputum.  Patient is also complaining of a dull headache/pressure in the back of her head.  Patient said it came on gradually and denies any vision changes or ringing in her ears.      Review of Systems   All other systems reviewed and are negative.      Past Medical History:   Diagnosis Date    Asthma     Cancer 10/06/2010    Coronary artery disease     Hypertension        Allergies   Allergen Reactions    Penicillins Hives       Past Surgical History:   Procedure Laterality Date    CARDIAC CATHETERIZATION      COLONOSCOPY W/ POLYPECTOMY N/A 8/3/2022    Procedure: COLONOSCOPY; POLYPECTOMY;  Surgeon: Anoop Cee MD;  Location: [facility];  Service: Gastroenterology;  Laterality: N/A;  POLYPS  DIVERTICULOSIS       History reviewed. No pertinent family history.    Social History     Socioeconomic History    Marital status:    Tobacco Use    Smoking status: Every Day     Current packs/day: 1.00     Types: Cigarettes     Passive exposure: Never    Smokeless tobacco: Never   Vaping Use    Vaping status: Never Used   Substance and Sexual Activity    Alcohol use: Not Currently    Drug use: Never    Sexual activity: Defer           Objective   Physical Exam  Vitals and nursing note reviewed.    Constitutional:       Appearance: She is well-developed.      Comments: Patient lying in bed comfortably, talkative, friendly.  Cooperative for exam.  No signs of distress.   HENT:      Head: Normocephalic.      Mouth/Throat:      Mouth: Mucous membranes are moist.   Eyes:      Conjunctiva/sclera: Conjunctivae normal.   Cardiovascular:      Rate and Rhythm: Normal rate and regular rhythm.      Pulses:           Radial pulses are 2+ on the right side and 2+ on the left side.        Dorsalis pedis pulses are 2+ on the right side and 2+ on the left side.        Posterior tibial pulses are 2+ on the right side and 2+ on the left side.   Pulmonary:      Effort: Pulmonary effort is normal. Tachypnea present.      Breath sounds: Normal breath sounds. Examination of the right-upper field reveals wheezing. Examination of the left-upper field reveals wheezing. Examination of the right-middle field reveals wheezing. Examination of the left-middle field reveals wheezing. Examination of the right-lower field reveals wheezing. Examination of the left-lower field reveals wheezing. No decreased breath sounds, rhonchi or rales.   Chest:      Chest wall: No tenderness or crepitus.   Abdominal:      Palpations: Abdomen is soft.   Musculoskeletal:      Cervical back: Neck supple.      Right lower leg: No edema.      Left lower leg: No edema.   Skin:     General: Skin is warm and dry.      Capillary Refill: Capillary refill takes 2 to 3 seconds.   Neurological:      Mental Status: She is alert and oriented to person, place, and time.   Psychiatric:         Mood and Affect: Mood normal.         Behavior: Behavior normal.         Procedures           ED Course                                             Medical Decision Making  Ddx pneumonia, bronchitis, pneumonitis, pneumothorax, heart failure, viral syndrome    XR Chest 1 View    Result Date: 3/12/2024  Impression: No active disease Electronically Signed: Chad Camilo MD  3/12/2024  1:11 PM EDT  Workstation ID: [id number]    Labs Reviewed  COMPREHENSIVE METABOLIC PANEL - Abnormal; Notable for the following components:     Chloride                      97 (*)                 Albumin                       3.3 (*)             All other components within normal limits         Narrative: GFR Normal >60                  Chronic Kidney Disease <60                  Kidney Failure <15                                    The GFR formula is only valid for adults with stable renal function between ages 18 and 70.  CBC WITH AUTO DIFFERENTIAL - Abnormal; Notable for the following components:     WBC                           17.07 (*)               Hematocrit                    47.0 (*)               Lymphocyte %                  14.7 (*)               Immature Grans %              0.9 (*)                Neutrophils, Absolute         12.28 (*)               Monocytes, Absolute           1.81 (*)               Immature Grans, Absolute      0.15 (*)            All other components within normal limits  CBC AND DIFFERENTIAL    1345 Pt seen again prior to d/c.  Labs/Imaging reviewed and are unremarkable except for elevated wbc.  Symptoms improved and pt feels better, vitals stable and pt. in NAD. Non-toxic. Comfortable. Ambulating without difficulty.  Tolerating po.  Relaxed breathing.  All questions personally answered at the bedside and all d/c instructions personally reviewed with pt.  Discussed the importance of close outpt. f/u and pt. understands this and agrees to do so.  Pt agrees to return to ED immediately for any new, persistent, or worsening symptoms.    EMR Dragon/Transcription disclaimer:  Much of this encounter note is an electronic transcription/translation of spoken language to printed text, aka voice recognition.  The electronic translation of spoken language may permit erroneous or at times nonsensical words or phrases to be inadvertently transcribed; although I have reviewed the note for such  errors, some may still exist so please interpret based on surrounding text content.        Problems Addressed:  Acute cough: complicated acute illness or injury  Atypical pneumonia: complicated acute illness or injury    Amount and/or Complexity of Data Reviewed  Labs: ordered.  Radiology: ordered.    Risk  OTC drugs.  Prescription drug management.        Final diagnoses:   Atypical pneumonia   Acute cough       ED Disposition  ED Disposition       ED Disposition   Discharge    Condition   Stable    Comment   --               Madeleine Bryant MD  [address] [id number] [phone]    In 3 days  If symptoms worsen         Medication List        New Prescriptions      dexAMETHasone 2 MG tablet  Commonly known as: DECADRON  Take 3 tabs p.o. daily on days 1 and 2, then 2 tabs p.o. on days 3 and 4, then 1 tab p.o. on days 5 and 6.     doxycycline 100 MG capsule  Commonly known as: MONODOX  Take 1 capsule by mouth Every 12 (Twelve) Hours.     guaiFENesin 600 MG 12 hr tablet  Commonly known as: MUCINEX  Take 2 tablets by mouth Every 12 (Twelve) Hours As Needed for Cough or Congestion.               Where to Get Your Medications        These medications were sent to [facility] - [place], [place] - [address] - [phone]  - [phone]   [address], [place] [address]      Phone: [phone]   dexAMETHasone 2 MG tablet  doxycycline 100 MG capsule  guaiFENesin 600 MG 12 hr tablet            Juan Hay MD  03/12/24 [id number]

## 2024-04-16 ENCOUNTER — LAB (OUTPATIENT)
Dept: LAB | Facility: HOSPITAL | Age: 79
End: 2024-04-16
Payer: OTHER GOVERNMENT

## 2024-04-16 ENCOUNTER — OFFICE VISIT (OUTPATIENT)
Dept: CARDIOLOGY | Facility: CLINIC | Age: 79
End: 2024-04-16
Payer: OTHER GOVERNMENT

## 2024-04-16 VITALS
SYSTOLIC BLOOD PRESSURE: 152 MMHG | OXYGEN SATURATION: 95 % | BODY MASS INDEX: 33.69 KG/M2 | HEART RATE: 77 BPM | HEIGHT: 60 IN | DIASTOLIC BLOOD PRESSURE: 82 MMHG | WEIGHT: 171.6 LBS

## 2024-04-16 DIAGNOSIS — I25.10 CORONARY ARTERY DISEASE INVOLVING NATIVE CORONARY ARTERY OF NATIVE HEART WITHOUT ANGINA PECTORIS: ICD-10-CM

## 2024-04-16 DIAGNOSIS — E78.2 MIXED HYPERLIPIDEMIA: ICD-10-CM

## 2024-04-16 DIAGNOSIS — Z72.0 TOBACCO USE: ICD-10-CM

## 2024-04-16 DIAGNOSIS — I10 PRIMARY HYPERTENSION: ICD-10-CM

## 2024-04-16 DIAGNOSIS — I25.10 CORONARY ARTERY DISEASE INVOLVING NATIVE CORONARY ARTERY OF NATIVE HEART WITHOUT ANGINA PECTORIS: Primary | ICD-10-CM

## 2024-04-16 LAB
CHOLEST SERPL-MCNC: 140 MG/DL (ref 0–200)
HDLC SERPL-MCNC: 66 MG/DL (ref 40–60)
LDLC SERPL CALC-MCNC: 62 MG/DL (ref 0–100)
LDLC/HDLC SERPL: 0.94 {RATIO}
TRIGL SERPL-MCNC: 59 MG/DL (ref 0–150)
VLDLC SERPL-MCNC: 12 MG/DL (ref 5–40)

## 2024-04-16 PROCEDURE — 99214 OFFICE O/P EST MOD 30 MIN: CPT | Performed by: STUDENT IN AN ORGANIZED HEALTH CARE EDUCATION/TRAINING PROGRAM

## 2024-04-16 PROCEDURE — 36415 COLL VENOUS BLD VENIPUNCTURE: CPT

## 2024-04-16 PROCEDURE — 80061 LIPID PANEL: CPT

## 2024-04-16 RX ORDER — PREDNISONE 1 MG/1
1 TABLET ORAL
COMMUNITY

## 2024-04-16 NOTE — PROGRESS NOTES
Can we please call Ms. Soliz and let her know the good news that her cholesterol panel is now excellent.  The bad cholesterol, LDL, is 62, the goal is to have less than 70.  Her HDL, the good cholesterol, is also high at 66.  Overall, her cholesterol medication is working very well no changes needed.  Thank you result.

## 2024-04-16 NOTE — PROGRESS NOTES
"      [facility]    Subjective:     Encounter Date:04/16/24      Patient ID: Veronica Soliz is a 78 y.o. female.    Chief Complaint: No chief complaint on file.  Follow-up for coronary heart disease  History of Present Illness    Ms. Soliz is a pleasant 77-year-old  past medical history multiple \"stress-induced\" heart attacks, with coronary angiography is without culprit vessels in 1998, 2005, chest pain, tobacco abuse, hyperlipidemia, hypertension, significant osteoarthritis, who presents for follow-up.  She usually gets her care through the [facility].      [facility] that she had a \"stress heart attack.\"  She reports that she was under a lot of stress due to recent medication changes from her chronic pain regimen due to osteoarthritis of bilateral knees, and states that she began to have a pressure-like twinging sensation that occurred at the center of her chest.  [facility] and [facility]ogram and a stress test.  The stress test revealed a small amount of apical ischemia, there is no report of any high risk features like transit ischemic dilation or reduced EF.  Her EF was normal on the stress test, as well as the echo.  Her echo did reveal diminished GLS    She presents to establish care for the abnormal stress test and her prior history of coronary disease that was nonobstructive.  Her most recent [facility].    She underwent a nuclear stress test on July 6, 2023:  She underwent Lexiscan protocol.  It was read as cannot rule out small reversible apical defect, LVEF 65%.  Tricuspid, normal aortic, mild calcification of mitral valve but otherwise, normal.  RA pressure 0-5.  She was noted to have mild obstructive disease on PFTs.    She presents here for follow-up.  She is tolerating the Lipitor 40 well.  She does have chronic osteoarthritis which predated the use of any " "statins.  She denies any chest pains.  She had a significant chest pain episode that woke her up from sleep and she was checked out at the ER here recently was diagnosed with atypical pneumonia as her pain was pleuritic and she had a productive cough.  She has had no exertional chest pain.  She does have chronic dyspnea related to COPD.  She continues to smoke but is trying to cut back.    The following portions of the patient's history were reviewed and updated as appropriate: allergies, current medications, past family history, past medical history, past social history, past surgical history and problem list.    Past Medical History:   Diagnosis Date    Asthma     Cancer 10/06/2010    Coronary artery disease     Hypertension        Past Surgical History:   Procedure Laterality Date    CARDIAC CATHETERIZATION      COLONOSCOPY W/ POLYPECTOMY N/A 8/3/2022    Procedure: COLONOSCOPY; POLYPECTOMY;  Surgeon: Anoop Cee MD;  Location: [facility];  Service: Gastroenterology;  Laterality: N/A;  POLYPS  DIVERTICULOSIS         Procedures       Objective:     Vitals:    04/16/24 1112   BP: 152/82   BP Location: Left arm   Pulse: 77   SpO2: 95%   Weight: 77.8 kg (171 lb 9.6 oz)   Height: 152.4 cm (60\")         Constitutional:       Appearance: Not in distress. Frail. Chronically ill-appearing.   Neck:      Vascular: JVD normal.   Pulmonary:      Effort: Pulmonary effort is normal.      Breath sounds: Normal air entry.      Comments: Scant expiratory wheeze, normal work of breathing  Cardiovascular:      PMI at left midclavicular line. Normal rate. Regular rhythm. Normal S2.       Murmurs: There is no murmur.   Pulses:     Intact distal pulses.   Edema:     Peripheral edema absent.   Skin:     General: Skin is warm and dry.   Neurological:      General: No focal deficit present.      Mental Status: Alert, oriented to person, place, and time and oriented to person, place and time.   Psychiatric:         Mood and " Affect: Mood and affect normal.         Lab Review:     Lipid Panel          7/26/2023    12:25   Lipid Panel   Total Cholesterol 170    Triglycerides 76    HDL Cholesterol 55    VLDL Cholesterol 14    LDL Cholesterol  101    LDL/HDL Ratio 1.81      BUN   Date Value Ref Range Status   03/12/2024 10 8 - 23 mg/dL Final     Creatinine   Date Value Ref Range Status   03/12/2024 0.89 0.57 - 1.00 mg/dL Final     Potassium   Date Value Ref Range Status   03/12/2024 3.5 3.5 - 5.2 mmol/L Final     ALT (SGPT)   Date Value Ref Range Status   03/12/2024 14 1 - 33 U/L Final     AST (SGOT)   Date Value Ref Range Status   03/12/2024 15 1 - 32 U/L Final         Performed        Assessment:          Diagnosis Plan   1. Coronary artery disease involving native coronary artery of native heart without angina pectoris  Lipid Panel      2. Tobacco use        3. Mixed hyperlipidemia  Lipid Panel      4. Primary hypertension               Plan:         Coronary artery disease, previously nonobstructive:   Abnormal stress test with small amount of apical ischemia without high risk features done at [facility] July 6, 2023  Currently, she is denying symptoms of angina.    Would continue current antianginal therapy, I did advise patient that if she has any findings of typical angina with a substernal chest pressure that worsens with exertion, or any new concerning findings like that to reach out to our office, but for now we will treat medically  Continue Imdur 60  Amlodipine 10  Not a candidate for beta-blockade due to resting bradycardia past, we can revisit this if she has any exertional angina.  Aspirin 81  I would be hesitant to proceed with a coronary angiogram with possible PCI given her overall frailty, and the fact that she does not appear to be having significant angina.  Her symptoms are stable.   Reviewed her recent echocardiogram for the [facility], normal LVEF, a abnormal diminished GLS.  I am not able to see these  images to determine if this is a good quality study or not.  If she has worsening dyspnea we can consider repeating an echo here  Hyperlipidemia: Rechecking lipids today.  On Lipitor 40.  Hypertension: Very well controlled, continue current regimen  Tobacco use.  Counseled.  She rolls her own cigarettes but I did tell her that any sort of tobacco is too much.  She will try to cut back.  Resources given.     RTC 1 year, sooner if any new issues arise.     Micah Campbell MD  [facility]  04/16/24  11:42 EDT       Current Outpatient Medications:     ALBUTEROL IN, Inhale., Disp: , Rfl:     amLODIPine (NORVASC) 10 MG tablet, Take 1 tablet by mouth Daily., Disp: , Rfl:     aspirin 81 MG chewable tablet, Chew 1 tablet Daily., Disp: , Rfl:     atorvastatin (LIPITOR) 40 MG tablet, Take 1 tablet by mouth Every Night., Disp: 90 tablet, Rfl: 3    cetirizine (zyrTEC) 10 MG tablet, Take 1 tablet by mouth Daily., Disp: , Rfl:     dexAMETHasone (DECADRON) 2 MG tablet, Take 3 tabs p.o. daily on days 1 and 2, then 2 tabs p.o. on days 3 and 4, then 1 tab p.o. on days 5 and 6., Disp: 12 tablet, Rfl: 0    doxycycline (MONODOX) 100 MG capsule, Take 1 capsule by mouth Every 12 (Twelve) Hours., Disp: 14 capsule, Rfl: 0    doxycycline (VIBRAMYCIN) 50 MG capsule, Take 1 capsule by mouth 2 (Two) Times a Day., Disp: , Rfl:     fluticasone (FLONASE) 50 MCG/ACT nasal spray, 2 sprays into the nostril(s) as directed by provider Daily., Disp: , Rfl:     guaiFENesin (MUCINEX) 600 MG 12 hr tablet, Take 2 tablets by mouth Every 12 (Twelve) Hours As Needed for Cough or Congestion., Disp: 20 tablet, Rfl: 0    isosorbide mononitrate (IMDUR) 60 MG 24 hr tablet, Take 1 tablet by mouth Daily., Disp: , Rfl:     levothyroxine (SYNTHROID, LEVOTHROID) 112 MCG tablet, Take 1 tablet by mouth Daily., Disp: , Rfl:     lisinopril (PRINIVIL,ZESTRIL) 10 MG tablet, Take 2 tablets by mouth Daily., Disp: , Rfl:     LORATADINE PO, Take 10 mg by mouth  Daily., Disp: , Rfl:     meclizine (ANTIVERT) 25 MG tablet, Take 1 tablet by mouth 3 (Three) Times a Day As Needed for Dizziness., Disp: , Rfl:     nitroglycerin (NITROSTAT) 0.4 MG SL tablet, Place 1 tablet under the tongue Every 5 (Five) Minutes As Needed for Chest Pain. Take no more than 3 doses in 15 minutes., Disp: , Rfl:     omeprazole (priLOSEC) 20 MG capsule, Take 1 capsule by mouth Daily., Disp: , Rfl:     oxybutynin (DITROPAN) 5 MG tablet, Take 1 tablet by mouth 3 (Three) Times a Day., Disp: , Rfl:     predniSONE (DELTASONE) 1 MG tablet, Take 1 tablet by mouth., Disp: , Rfl:     traMADol (ULTRAM) 50 MG tablet, Take 2 tablets by mouth Every 8 (Eight) Hours As Needed for Moderate Pain., Disp: , Rfl:          Return in about 6 months (around 10/16/2024).      Part of this note may be an electronic transcription/translation of spoken language to printed text using the Dragon Dictation System.

## 2024-04-17 ENCOUNTER — TELEPHONE (OUTPATIENT)
Dept: CARDIOLOGY | Facility: CLINIC | Age: 79
End: 2024-04-17
Payer: OTHER GOVERNMENT

## 2024-04-17 NOTE — TELEPHONE ENCOUNTER
----- Message from Micah Campbell MD sent at 4/16/2024  4:23 PM EDT -----  Can we please call Ms. Soliz and let her know the good news that her cholesterol panel is now excellent.  The bad cholesterol, LDL, is 62, the goal is to have less than 70.  Her HDL, the good cholesterol, is also high at 66.  Overall, her cholesterol medication is working very well no changes needed.  Thank you result.

## 2024-04-17 NOTE — TELEPHONE ENCOUNTER
Permission for the hub to transfer this call directly to the nurse triage line at [facility].    Attempted to call Veronica Martínez, no answer.  Left a voicemail for patient to call back and ask to speak with the triage nurses.  Will continue to try to reach patient.    Eva Michele RN  [facility] Triage  04/17/24 08:49 EDT

## 2024-04-19 NOTE — TELEPHONE ENCOUNTER
Permission for the hub to transfer this call directly to the nurse triage line at [facility].    Attempted to call patient, but no answer.  No option to leave a voicemail.  Will continue to try and reach patient.    Eva Michele RN  [facility] Triage  04/19/24 12:29 EDT

## 2024-08-06 ENCOUNTER — TELEPHONE (OUTPATIENT)
Dept: NEUROLOGY | Facility: CLINIC | Age: 79
End: 2024-08-06
Payer: OTHER GOVERNMENT

## 2024-08-06 NOTE — TELEPHONE ENCOUNTER
Attempted to LVM but mailbox was full, sent mail reminder in regards to new scheduled appt due to provider being out of office.

## 2024-12-10 ENCOUNTER — OFFICE VISIT (OUTPATIENT)
Dept: CARDIOLOGY | Facility: CLINIC | Age: 79
End: 2024-12-10
Payer: OTHER GOVERNMENT

## 2024-12-10 VITALS
BODY MASS INDEX: 38.31 KG/M2 | WEIGHT: 195.1 LBS | HEIGHT: 60 IN | DIASTOLIC BLOOD PRESSURE: 82 MMHG | SYSTOLIC BLOOD PRESSURE: 138 MMHG | OXYGEN SATURATION: 97 %

## 2024-12-10 DIAGNOSIS — E78.2 MIXED HYPERLIPIDEMIA: ICD-10-CM

## 2024-12-10 DIAGNOSIS — R06.09 DYSPNEA ON EXERTION: ICD-10-CM

## 2024-12-10 DIAGNOSIS — I25.10 NONOBSTRUCTIVE ATHEROSCLEROSIS OF CORONARY ARTERY: Primary | ICD-10-CM

## 2024-12-10 DIAGNOSIS — I10 PRIMARY HYPERTENSION: ICD-10-CM

## 2024-12-10 DIAGNOSIS — I20.89 CHRONIC STABLE ANGINA: ICD-10-CM

## 2024-12-10 RX ORDER — ISOSORBIDE MONONITRATE 60 MG/1
60 TABLET, EXTENDED RELEASE ORAL DAILY
Qty: 90 TABLET | Refills: 3 | Status: SHIPPED | OUTPATIENT
Start: 2024-12-10 | End: 2025-12-10

## 2024-12-10 RX ORDER — ASPIRIN 81 MG/1
81 TABLET, CHEWABLE ORAL DAILY
Qty: 90 TABLET | Refills: 3 | Status: SHIPPED | OUTPATIENT
Start: 2024-12-10 | End: 2025-12-10

## 2024-12-10 RX ORDER — ATORVASTATIN CALCIUM 40 MG/1
40 TABLET, FILM COATED ORAL DAILY
Qty: 90 TABLET | Refills: 3 | Status: SHIPPED | OUTPATIENT
Start: 2024-12-10

## 2024-12-10 NOTE — PROGRESS NOTES
"      [facility]    Subjective:     Encounter Date:12/10/24      Patient ID: Veronica Soliz is a 79 y.o. female.    Chief Complaint:   Chief Complaint   Patient presents with    Coronary artery disease involving native coronary artery of     Patient is in the office today for her 6 month follow up appointment.    Follow-up for coronary heart disease  History of Present Illness    Ms. Soliz is a pleasant 79 y.o.  past medical history multiple \"stress-induced\" heart attacks, with coronary angiography is without culprit vessels in 1998, 2005, chest pain, tobacco abuse, hyperlipidemia, hypertension, significant osteoarthritis, who presents for follow-up.  She usually gets her care through the [facility].      [facility] that she had a \"stress heart attack.\"  She reports that she was under a lot of stress due to recent medication changes from her chronic pain regimen due to osteoarthritis of bilateral knees, and states that she began to have a pressure-like twinging sensation that occurred at the center of her chest.  [facility] and [facility] and a stress test.  The stress test revealed a small amount of apical ischemia, there is no report of any high risk features like transit ischemic dilation or reduced EF.  Her EF was normal on the stress test, as well as the echo.  Her echo did reveal diminished GLS    She presents to establish care for the abnormal stress test and her prior history of coronary disease that was nonobstructive.  [facility] most [facility].    She underwent a nuclear stress test on July 6, 2023:  She underwent Lexiscan protocol.  It was read as cannot rule out small reversible apical defect, LVEF 65%.  Tricuspid, normal aortic, mild calcification of mitral valve but otherwise, normal.  RA pressure 0-5.  She was noted to have mild obstructive disease on " "PFTs.    She presents here for follow-up.  She is tolerating the Lipitor 40 well.  She does have chronic osteoarthritis which predated the use of any statins.  She denies any chest pains.  She had a significant chest pain episode that woke her up from sleep and she was checked out at the ER here In March 2024 was diagnosed with atypical pneumonia as her pain was pleuritic and she had a productive cough.  She has had no exertional chest pain.  She does have chronic dyspnea related to COPD.  She continues to smoke but is trying to cut back.     Otherwise she is status quo.  She has not required nitroglycerin.  She has no further chest pains currently.  She feels okay aside from having some worsening wheezing in the setting of seasonal changes.    The following portions of the patient's history were reviewed and updated as appropriate: allergies, current medications, past family history, past medical history, past social history, past surgical history and problem list.    Past Medical History:   Diagnosis Date    Asthma     Cancer 10/06/2010    Coronary artery disease     Hypertension        Past Surgical History:   Procedure Laterality Date    CARDIAC CATHETERIZATION      COLONOSCOPY W/ POLYPECTOMY N/A 8/3/2022    Procedure: COLONOSCOPY; POLYPECTOMY;  Surgeon: Anoop Cee MD;  Location: [facility];  Service: Gastroenterology;  Laterality: N/A;  POLYPS  DIVERTICULOSIS         Procedures       Objective:     Vitals:    12/10/24 1150   BP: 138/82   BP Location: Left arm   Patient Position: Sitting   Cuff Size: Adult   SpO2: 97%   Weight: 88.5 kg (195 lb 1.6 oz)   Height: 152.4 cm (60\")         Constitutional:       Appearance: Not in distress. Frail. Chronically ill-appearing.   Neck:      Vascular: JVD normal.   Pulmonary:      Effort: Pulmonary effort is normal.      Breath sounds: Normal air entry.      Comments: Normal work of breathing on room air.  Faint expiratory wheezing noted " today.  Cardiovascular:      PMI at left midclavicular line. Normal rate. Regular rhythm. Normal S2.       Murmurs: There is no murmur.   Pulses:     Intact distal pulses.   Edema:     Peripheral edema absent.   Skin:     General: Skin is warm and dry.   Neurological:      General: No focal deficit present.      Mental Status: Alert, oriented to person, place, and time and oriented to person, place and time.   Psychiatric:         Mood and Affect: Mood and affect normal.         Lab Review:     Lipid Panel          4/16/2024    11:51   Lipid Panel   Total Cholesterol 140    Triglycerides 59    HDL Cholesterol 66    VLDL Cholesterol 12    LDL Cholesterol  62    LDL/HDL Ratio 0.94      BUN   Date Value Ref Range Status   03/12/2024 10 8 - 23 mg/dL Final     Creatinine   Date Value Ref Range Status   03/12/2024 0.89 0.57 - 1.00 mg/dL Final     Potassium   Date Value Ref Range Status   03/12/2024 3.5 3.5 - 5.2 mmol/L Final     ALT (SGPT)   Date Value Ref Range Status   03/12/2024 14 1 - 33 U/L Final     AST (SGOT)   Date Value Ref Range Status   03/12/2024 15 1 - 32 U/L Final         Performed        Assessment:          Diagnosis Plan   1. Nonobstructive atherosclerosis of coronary artery        2. Dyspnea on exertion        3. Chronic stable angina                 Plan:         Coronary artery disease, previously nonobstructive:   Abnormal stress test with small amount of apical ischemia without high risk features done at [facility] July 6, 2023  Currently, she is denying symptoms of angina.    Would continue current antianginal therapy, I did advise patient that if she has any findings of typical angina with a substernal chest pressure that worsens with exertion, or any new concerning findings like that to reach out to our office, but for now we will treat medically  Continue Imdur 60  Amlodipine 10  Not a candidate for beta-blockade due to resting bradycardia past, we can revisit this if she has any  exertional angina.  Heart rate a bit up today but she also states that she is wheezing but more due to seasonal changes.  Aspirin 81  I would be hesitant to proceed with a coronary angiogram with possible PCI given her overall frailty, and the fact that she does not appear to be having significant angina.  Her symptoms are stable.   Reviewed her recent echocardiogram for the [facility], normal LVEF, a abnormal diminished GLS.  I am not able to see these images to determine if this is a good quality study or not.  If she has worsening dyspnea we can consider repeating an echo here  Hyperlipidemia: LDL less than 70 on Lipitor 40.  Continue, currently 62  Hypertension: Continue current regimen.  Tobacco use.  Counseled.  She rolls her own cigarettes but I did tell her that any sort of tobacco is too much.  She remains precontemplative.  Continue discussions.     RTC 1 year, sooner if any new issues arise.  Imdur, atorvastatin and aspirin were renewed today.    Micah Campbell MD  [facility]  12/10/24  11:42 EDT       Current Outpatient Medications:     ALBUTEROL IN, Inhale., Disp: , Rfl:     amLODIPine (NORVASC) 10 MG tablet, Take 1 tablet by mouth Daily., Disp: , Rfl:     aspirin 81 MG chewable tablet, Chew 1 tablet Daily., Disp: 90 tablet, Rfl: 3    cetirizine (zyrTEC) 10 MG tablet, Take 1 tablet by mouth Daily., Disp: , Rfl:     dexAMETHasone (DECADRON) 2 MG tablet, Take 3 tabs p.o. daily on days 1 and 2, then 2 tabs p.o. on days 3 and 4, then 1 tab p.o. on days 5 and 6., Disp: 12 tablet, Rfl: 0    doxycycline (MONODOX) 100 MG capsule, Take 1 capsule by mouth Every 12 (Twelve) Hours., Disp: 14 capsule, Rfl: 0    doxycycline (VIBRAMYCIN) 50 MG capsule, Take 1 capsule by mouth 2 (Two) Times a Day., Disp: , Rfl:     fluticasone (FLONASE) 50 MCG/ACT nasal spray, Administer 2 sprays into the nostril(s) as directed by provider Daily., Disp: , Rfl:     guaiFENesin (MUCINEX) 600 MG 12 hr tablet, Take 2  tablets by mouth Every 12 (Twelve) Hours As Needed for Cough or Congestion., Disp: 20 tablet, Rfl: 0    isosorbide mononitrate (IMDUR) 60 MG 24 hr tablet, Take 1 tablet by mouth Daily., Disp: 90 tablet, Rfl: 3    levothyroxine (SYNTHROID, LEVOTHROID) 112 MCG tablet, Take 1 tablet by mouth Daily., Disp: , Rfl:     lisinopril (PRINIVIL,ZESTRIL) 10 MG tablet, Take 2 tablets by mouth Daily., Disp: , Rfl:     LORATADINE PO, Take 10 mg by mouth Daily., Disp: , Rfl:     meclizine (ANTIVERT) 25 MG tablet, Take 1 tablet by mouth 3 (Three) Times a Day As Needed for Dizziness., Disp: , Rfl:     nitroglycerin (NITROSTAT) 0.4 MG SL tablet, Place 1 tablet under the tongue Every 5 (Five) Minutes As Needed for Chest Pain. Take no more than 3 doses in 15 minutes., Disp: , Rfl:     omeprazole (priLOSEC) 20 MG capsule, Take 1 capsule by mouth Daily., Disp: , Rfl:     oxybutynin (DITROPAN) 5 MG tablet, Take 1 tablet by mouth 3 (Three) Times a Day., Disp: , Rfl:     predniSONE (DELTASONE) 1 MG tablet, Take 1 tablet by mouth., Disp: , Rfl:     atorvastatin (LIPITOR) 40 MG tablet, Take 1 tablet by mouth Daily., Disp: 90 tablet, Rfl: 3    traMADol (ULTRAM) 50 MG tablet, Take 2 tablets by mouth Every 8 (Eight) Hours As Needed for Moderate Pain. (Patient not taking: Reported on 12/10/2024), Disp: , Rfl:          Return in about 1 year (around 12/10/2025).      Part of this note may be an electronic transcription/translation of spoken language to printed text using the Dragon Dictation System.

## 2025-03-18 ENCOUNTER — HOSPITAL ENCOUNTER (EMERGENCY)
Facility: HOSPITAL | Age: 80
Discharge: HOME | End: 2025-03-18
Attending: STUDENT IN AN ORGANIZED HEALTH CARE EDUCATION/TRAINING PROGRAM | Admitting: STUDENT IN AN ORGANIZED HEALTH CARE EDUCATION/TRAINING PROGRAM
Payer: OTHER GOVERNMENT

## 2025-03-18 ENCOUNTER — APPOINTMENT (OUTPATIENT)
Dept: GENERAL RADIOLOGY | Facility: HOSPITAL | Age: 80
End: 2025-03-18
Payer: OTHER GOVERNMENT

## 2025-03-18 VITALS
BODY MASS INDEX: 37.87 KG/M2 | OXYGEN SATURATION: 96 % | TEMPERATURE: 97.6 F | WEIGHT: 192.9 LBS | HEART RATE: 68 BPM | DIASTOLIC BLOOD PRESSURE: 97 MMHG | RESPIRATION RATE: 18 BRPM | HEIGHT: 60 IN | SYSTOLIC BLOOD PRESSURE: 117 MMHG

## 2025-03-18 DIAGNOSIS — J44.1 CHRONIC OBSTRUCTIVE PULMONARY DISEASE WITH ACUTE EXACERBATION: ICD-10-CM

## 2025-03-18 DIAGNOSIS — R49.0 HOARSE VOICE QUALITY: Primary | ICD-10-CM

## 2025-03-18 LAB
FLUAV RNA RESP QL NAA+PROBE: NOT DETECTED
FLUBV RNA RESP QL NAA+PROBE: NOT DETECTED
RSV RNA RESP QL NAA+PROBE: NOT DETECTED
SARS-COV-2 RNA RESP QL NAA+PROBE: NOT DETECTED

## 2025-03-18 PROCEDURE — 71045 X-RAY EXAM CHEST 1 VIEW: CPT

## 2025-03-18 PROCEDURE — 93005 ELECTROCARDIOGRAM TRACING: CPT | Performed by: STUDENT IN AN ORGANIZED HEALTH CARE EDUCATION/TRAINING PROGRAM

## 2025-03-18 PROCEDURE — 99284 EMERGENCY DEPT VISIT MOD MDM: CPT | Performed by: STUDENT IN AN ORGANIZED HEALTH CARE EDUCATION/TRAINING PROGRAM

## 2025-03-18 PROCEDURE — 25010000002 DEXAMETHASONE PER 1 MG: Performed by: STUDENT IN AN ORGANIZED HEALTH CARE EDUCATION/TRAINING PROGRAM

## 2025-03-18 PROCEDURE — 96372 THER/PROPH/DIAG INJ SC/IM: CPT

## 2025-03-18 PROCEDURE — 94640 AIRWAY INHALATION TREATMENT: CPT

## 2025-03-18 PROCEDURE — 87637 SARSCOV2&INF A&B&RSV AMP PRB: CPT | Performed by: STUDENT IN AN ORGANIZED HEALTH CARE EDUCATION/TRAINING PROGRAM

## 2025-03-18 RX ORDER — IPRATROPIUM BROMIDE AND ALBUTEROL SULFATE 2.5; .5 MG/3ML; MG/3ML
3 SOLUTION RESPIRATORY (INHALATION) ONCE
Status: COMPLETED | OUTPATIENT
Start: 2025-03-18 | End: 2025-03-18

## 2025-03-18 RX ORDER — PANTOPRAZOLE SODIUM 40 MG/1
40 TABLET, DELAYED RELEASE ORAL ONCE
Status: COMPLETED | OUTPATIENT
Start: 2025-03-18 | End: 2025-03-18

## 2025-03-18 RX ORDER — OMEPRAZOLE 20 MG/1
40 CAPSULE, DELAYED RELEASE ORAL 2 TIMES DAILY
Qty: 60 CAPSULE | Refills: 0 | Status: SHIPPED | OUTPATIENT
Start: 2025-03-18

## 2025-03-18 RX ORDER — FLUTICASONE PROPIONATE 50 MCG
1 SPRAY, SUSPENSION (ML) NASAL DAILY
Qty: 16 G | Refills: 0 | Status: SHIPPED | OUTPATIENT
Start: 2025-03-18

## 2025-03-18 RX ORDER — ALBUTEROL SULFATE 0.83 MG/ML
2.5 SOLUTION RESPIRATORY (INHALATION) EVERY 4 HOURS PRN
Qty: 120 ML | Refills: 0 | Status: SHIPPED | OUTPATIENT
Start: 2025-03-18

## 2025-03-18 RX ORDER — DEXAMETHASONE SODIUM PHOSPHATE 4 MG/ML
10 INJECTION, SOLUTION INTRA-ARTICULAR; INTRALESIONAL; INTRAMUSCULAR; INTRAVENOUS; SOFT TISSUE ONCE
Status: COMPLETED | OUTPATIENT
Start: 2025-03-18 | End: 2025-03-18

## 2025-03-18 RX ORDER — PREDNISONE 50 MG/1
50 TABLET ORAL DAILY
Qty: 5 TABLET | Refills: 0 | Status: SHIPPED | OUTPATIENT
Start: 2025-03-18 | End: 2025-03-23

## 2025-03-18 RX ADMIN — DEXAMETHASONE SODIUM PHOSPHATE 10 MG: 4 INJECTION INTRA-ARTICULAR; INTRALESIONAL; INTRAMUSCULAR; INTRAVENOUS; SOFT TISSUE at 21:27

## 2025-03-18 RX ADMIN — PANTOPRAZOLE SODIUM 40 MG: 40 TABLET, DELAYED RELEASE ORAL at 21:27

## 2025-03-18 RX ADMIN — IPRATROPIUM BROMIDE AND ALBUTEROL SULFATE 3 ML: .5; 3 SOLUTION RESPIRATORY (INHALATION) at 21:43

## 2025-03-19 LAB
QT INTERVAL: 408 MS
QTC INTERVAL: 440 MS

## 2025-03-19 NOTE — ED PROVIDER NOTES
Subjective   History of Present Illness  79 female history of COPD presents to the ER chief complaint of several weeks of progressively worsening hoarse voice associated symptoms of shortness of breath, worse with exertion context of visiting [place] 3 weeks ago.  She also relates his symptomatology to exposure to rats in a new home that she had bought in the area, as well as black mold that she found within the ceiling.  denies current chills, nausea, vomiting, diarrhea, constipation.  ROS otherwise negative vitals are within normal limits on exam appears well-appearing, minimal end expiratory wheezing on auscultation, no stridor auscultated.  Review of Systems    Past Medical History:   Diagnosis Date    Asthma     Cancer 10/06/2010    Coronary artery disease     Hypertension        Allergies   Allergen Reactions    Penicillins Hives       Past Surgical History:   Procedure Laterality Date    CARDIAC CATHETERIZATION      COLONOSCOPY W/ POLYPECTOMY N/A 8/3/2022    Procedure: COLONOSCOPY; POLYPECTOMY;  Surgeon: Anoop Cee MD;  Location: [facility];  Service: Gastroenterology;  Laterality: N/A;  POLYPS  DIVERTICULOSIS       History reviewed. No pertinent family history.    Social History     Socioeconomic History    Marital status:    Tobacco Use    Smoking status: Every Day     Current packs/day: 1.00     Types: Cigarettes     Passive exposure: Current    Smokeless tobacco: Never   Vaping Use    Vaping status: Never Used   Substance and Sexual Activity    Alcohol use: Not Currently    Drug use: Never    Sexual activity: Defer           Objective   Physical Exam    Procedures           ED Course                                                       Medical Decision Making  Problems Addressed:  Chronic obstructive pulmonary disease with acute exacerbation: complicated acute illness or injury  Hoarse voice quality: complicated acute illness or injury    Amount and/or Complexity of Data  Reviewed  Radiology: ordered.  ECG/medicine tests: ordered.    Risk  Prescription drug management.    79 female history of COPD presents to the ER chief complaint of several weeks of progressively worsening hoarse voice associated symptoms of shortness of breath, worse with exertion context of visiting [place] 3 weeks ago.  She also relates his symptomatology to exposure to rats in a new home that she had bought in the area, as well as black mold that she found within the ceiling.  denies current chills, nausea, vomiting, diarrhea, constipation.  ROS otherwise negative vitals are within normal limits on exam appears well-appearing, minimal end expiratory wheezing on auscultation, no stridor auscultated.    With a hoarse voice that has been prolonged, consideration to anatomic abnormality but much more likely to represent an initial injury secondary to dry air, recent infection, then I will likely require steroid treatment, humidified air, antacid medication, to ensure resolution, as well as ENT follow up for scoping if does not resolve.    Will eval w/ cxr; viral swabs. Tx w/ omeprazole 40 mg po, decadron 10 mg   IM, duoneb INH      Cxr clear; viral swabs neg.    Dc with humidifier, omeprazole, flonase inh, pulm fu, pred pack and alb inh  Hst pt  Dsp home    Final diagnoses:   Hoarse voice quality   Chronic obstructive pulmonary disease with acute exacerbation       ED Disposition  ED Disposition       ED Disposition   Discharge    Condition   Stable    Comment   --               Madeleine Bryant MD  [address] [facility] [id number] [phone]    In 2 days      Frankie Brito MD  [address] [facility]  [address]  [facility] [id number] [phone]          [facility]   [address] [facility] [address]  [facility] [address]  [phone]             Medication List        New Prescriptions      albuterol (2.5 MG/3ML) 0.083% nebulizer solution  Commonly known as: PROVENTIL  Take 2.5 mg by nebulization  Every 4 (Four) Hours As Needed for Wheezing.     predniSONE 50 MG tablet  Commonly known as: DELTASONE  Take 1 tablet by mouth Daily for 5 days.            Changed      fluticasone 50 MCG/ACT nasal spray  Commonly known as: FLONASE  Administer 1 spray into the nostril(s) as directed by provider Daily.  What changed: how much to take     omeprazole 20 MG capsule  Commonly known as: priLOSEC  Take 2 capsules by mouth 2 (Two) Times a Day.  What changed:   how much to take  when to take this               Where to Get Your Medications        These medications were sent to [facility] - [place], [place] - [address] [place] [address] - [phone]  - [phone] [address] [address]      Phone: [phone]   albuterol (2.5 MG/3ML) 0.083% nebulizer solution  fluticasone 50 MCG/ACT nasal spray  omeprazole 20 MG capsule  predniSONE 50 MG tablet            Giovanny Begum MD  03/18/25 2484

## 2025-08-08 ENCOUNTER — HOSPITAL ENCOUNTER (EMERGENCY)
Facility: HOSPITAL | Age: 80
Discharge: HOME | End: 2025-08-08
Attending: STUDENT IN AN ORGANIZED HEALTH CARE EDUCATION/TRAINING PROGRAM
Payer: OTHER GOVERNMENT

## 2025-08-08 VITALS
OXYGEN SATURATION: 94 % | TEMPERATURE: 98.1 F | DIASTOLIC BLOOD PRESSURE: 83 MMHG | HEIGHT: 60 IN | SYSTOLIC BLOOD PRESSURE: 141 MMHG | WEIGHT: 200 LBS | BODY MASS INDEX: 39.27 KG/M2 | HEART RATE: 63 BPM | RESPIRATION RATE: 20 BRPM

## 2025-08-08 DIAGNOSIS — L08.9 WOUND INFECTION: Primary | ICD-10-CM

## 2025-08-08 DIAGNOSIS — T14.8XXA WOUND INFECTION: Primary | ICD-10-CM

## 2025-08-08 PROCEDURE — 99282 EMERGENCY DEPT VISIT SF MDM: CPT | Performed by: STUDENT IN AN ORGANIZED HEALTH CARE EDUCATION/TRAINING PROGRAM

## 2025-08-08 PROCEDURE — 90715 TDAP VACCINE 7 YRS/> IM: CPT

## 2025-08-08 PROCEDURE — 25010000002 TETANUS-DIPHTH-ACELL PERTUSSIS 5-2.5-18.5 LF-MCG/0.5 SUSPENSION PREFILLED SYRINGE

## 2025-08-08 PROCEDURE — 90471 IMMUNIZATION ADMIN: CPT

## 2025-08-08 RX ORDER — DOXYCYCLINE 100 MG/1
100 CAPSULE ORAL 2 TIMES DAILY
Qty: 14 CAPSULE | Refills: 0 | Status: SHIPPED | OUTPATIENT
Start: 2025-08-08 | End: 2025-08-15

## 2025-08-08 RX ADMIN — TETANUS TOXOID, REDUCED DIPHTHERIA TOXOID AND ACELLULAR PERTUSSIS VACCINE, ADSORBED 0.5 ML: 5; 2.5; 8; 8; 2.5 SUSPENSION INTRAMUSCULAR at 17:21

## (undated) DEVICE — GLV SURG SENSICARE PI MIC PF SZ7.5 LF STRL

## (undated) DEVICE — SAFELINER SUCTION CANISTER 1000CC: Brand: DEROYAL

## (undated) DEVICE — ADAPT CLN BIOGUARD AIR/H2O DISP

## (undated) DEVICE — BW-412T DISP COMBO CLEANING BRUSH: Brand: SINGLE USE COMBINATION CLEANING BRUSH

## (undated) DEVICE — KT ORCA ORCAPOD DISP STRL

## (undated) DEVICE — SPNG GZ WOVN 4X4IN 12PLY 10/BX STRL

## (undated) DEVICE — SNAR POLYP SENSATION STDOVL 27 240 BX40

## (undated) DEVICE — JACKT LAB F/R KNIT CUFF/COLR XLG BLU

## (undated) DEVICE — SYR LL 3CC

## (undated) DEVICE — FRCP BX RADJAW4 NDL 2.8 240CM LG OG BX40

## (undated) DEVICE — THE SINGLE USE ETRAP – POLYP TRAP IS USED FOR SUCTION RETRIEVAL OF ENDOSCOPICALLY REMOVED POLYPS.: Brand: ETRAP

## (undated) DEVICE — Device

## (undated) DEVICE — VIAL FORMALIN CAP 10P 40ML